# Patient Record
Sex: FEMALE | Race: BLACK OR AFRICAN AMERICAN | Employment: UNEMPLOYED | URBAN - METROPOLITAN AREA
[De-identification: names, ages, dates, MRNs, and addresses within clinical notes are randomized per-mention and may not be internally consistent; named-entity substitution may affect disease eponyms.]

---

## 2018-10-12 ENCOUNTER — HOSPITAL ENCOUNTER (EMERGENCY)
Facility: CLINIC | Age: 33
Discharge: HOME | End: 2018-10-13
Attending: EMERGENCY MEDICINE | Admitting: EMERGENCY MEDICINE
Payer: COMMERCIAL

## 2018-10-12 VITALS
DIASTOLIC BLOOD PRESSURE: 61 MMHG | OXYGEN SATURATION: 100 % | TEMPERATURE: 98.1 F | HEART RATE: 92 BPM | SYSTOLIC BLOOD PRESSURE: 110 MMHG | RESPIRATION RATE: 16 BRPM

## 2018-10-12 DIAGNOSIS — R10.2 VAGINAL PAIN: ICD-10-CM

## 2018-10-12 DIAGNOSIS — N94.3 PMS (PREMENSTRUAL SYNDROME): ICD-10-CM

## 2018-10-12 LAB
ALBUMIN UR-MCNC: NEGATIVE MG/DL
ANION GAP SERPL CALCULATED.3IONS-SCNC: 6 MMOL/L (ref 3–14)
APPEARANCE UR: CLEAR
BACTERIA #/AREA URNS HPF: ABNORMAL /HPF
BASOPHILS # BLD AUTO: 0 10E9/L (ref 0–0.2)
BASOPHILS NFR BLD AUTO: 0.2 %
BILIRUB UR QL STRIP: NEGATIVE
BUN SERPL-MCNC: 13 MG/DL (ref 7–30)
CALCIUM SERPL-MCNC: 8.3 MG/DL (ref 8.5–10.1)
CHLORIDE SERPL-SCNC: 107 MMOL/L (ref 94–109)
CO2 SERPL-SCNC: 28 MMOL/L (ref 20–32)
COLOR UR AUTO: ABNORMAL
CREAT SERPL-MCNC: 0.62 MG/DL (ref 0.52–1.04)
DIFFERENTIAL METHOD BLD: ABNORMAL
EOSINOPHIL # BLD AUTO: 0.2 10E9/L (ref 0–0.7)
EOSINOPHIL NFR BLD AUTO: 2.8 %
ERYTHROCYTE [DISTWIDTH] IN BLOOD BY AUTOMATED COUNT: 13.3 % (ref 10–15)
GFR SERPL CREATININE-BSD FRML MDRD: >90 ML/MIN/1.7M2
GLUCOSE SERPL-MCNC: 89 MG/DL (ref 70–99)
GLUCOSE UR STRIP-MCNC: NEGATIVE MG/DL
HCG UR QL: NEGATIVE
HCT VFR BLD AUTO: 34.4 % (ref 35–47)
HGB BLD-MCNC: 11.3 G/DL (ref 11.7–15.7)
HGB UR QL STRIP: NEGATIVE
IMM GRANULOCYTES # BLD: 0 10E9/L (ref 0–0.4)
IMM GRANULOCYTES NFR BLD: 0.2 %
INTERNAL QC OK POCT: YES
KETONES UR STRIP-MCNC: NEGATIVE MG/DL
LEUKOCYTE ESTERASE UR QL STRIP: NEGATIVE
LYMPHOCYTES # BLD AUTO: 2 10E9/L (ref 0.8–5.3)
LYMPHOCYTES NFR BLD AUTO: 35.8 %
MCH RBC QN AUTO: 29.1 PG (ref 26.5–33)
MCHC RBC AUTO-ENTMCNC: 32.8 G/DL (ref 31.5–36.5)
MCV RBC AUTO: 89 FL (ref 78–100)
MONOCYTES # BLD AUTO: 0.3 10E9/L (ref 0–1.3)
MONOCYTES NFR BLD AUTO: 5.9 %
NEUTROPHILS # BLD AUTO: 3.1 10E9/L (ref 1.6–8.3)
NEUTROPHILS NFR BLD AUTO: 55.1 %
NITRATE UR QL: NEGATIVE
NRBC # BLD AUTO: 0 10*3/UL
NRBC BLD AUTO-RTO: 0 /100
PH UR STRIP: 7 PH (ref 5–7)
PLATELET # BLD AUTO: 191 10E9/L (ref 150–450)
POTASSIUM SERPL-SCNC: 4.1 MMOL/L (ref 3.4–5.3)
RBC # BLD AUTO: 3.88 10E12/L (ref 3.8–5.2)
RBC #/AREA URNS AUTO: 0 /HPF (ref 0–2)
SODIUM SERPL-SCNC: 141 MMOL/L (ref 133–144)
SOURCE: ABNORMAL
SP GR UR STRIP: 1.01 (ref 1–1.03)
SQUAMOUS #/AREA URNS AUTO: 3 /HPF (ref 0–1)
UROBILINOGEN UR STRIP-MCNC: NORMAL MG/DL (ref 0–2)
WBC # BLD AUTO: 5.6 10E9/L (ref 4–11)
WBC #/AREA URNS AUTO: <1 /HPF (ref 0–5)

## 2018-10-12 PROCEDURE — 99284 EMERGENCY DEPT VISIT MOD MDM: CPT

## 2018-10-12 PROCEDURE — 99284 EMERGENCY DEPT VISIT MOD MDM: CPT | Mod: Z6 | Performed by: EMERGENCY MEDICINE

## 2018-10-12 PROCEDURE — 81001 URINALYSIS AUTO W/SCOPE: CPT | Performed by: STUDENT IN AN ORGANIZED HEALTH CARE EDUCATION/TRAINING PROGRAM

## 2018-10-12 PROCEDURE — 87210 SMEAR WET MOUNT SALINE/INK: CPT | Performed by: EMERGENCY MEDICINE

## 2018-10-12 PROCEDURE — 81025 URINE PREGNANCY TEST: CPT | Performed by: EMERGENCY MEDICINE

## 2018-10-12 PROCEDURE — 87491 CHLMYD TRACH DNA AMP PROBE: CPT | Performed by: EMERGENCY MEDICINE

## 2018-10-12 PROCEDURE — 87591 N.GONORRHOEAE DNA AMP PROB: CPT | Performed by: EMERGENCY MEDICINE

## 2018-10-12 PROCEDURE — 85025 COMPLETE CBC W/AUTO DIFF WBC: CPT | Performed by: EMERGENCY MEDICINE

## 2018-10-12 PROCEDURE — 80048 BASIC METABOLIC PNL TOTAL CA: CPT | Performed by: EMERGENCY MEDICINE

## 2018-10-12 RX ORDER — IBUPROFEN 200 MG
400 TABLET ORAL EVERY 8 HOURS PRN
Qty: 30 TABLET | Refills: 0 | Status: SHIPPED | OUTPATIENT
Start: 2018-10-12

## 2018-10-12 ASSESSMENT — ENCOUNTER SYMPTOMS
BACK PAIN: 1
PALPITATIONS: 0
NAUSEA: 0
ABDOMINAL PAIN: 1
FATIGUE: 1
DIZZINESS: 0
DYSURIA: 0
CHILLS: 0
DIFFICULTY URINATING: 0
DIARRHEA: 0
SHORTNESS OF BREATH: 0
COUGH: 0
FEVER: 0
VOMITING: 0
CONSTIPATION: 0

## 2018-10-12 NOTE — ED AVS SNAPSHOT
[facility], Emergency Department    [address]    [place] [address]    Phone:  [phone]    Fax:  [phone]                                       Ella Jacobs   MRN: [id number]    Department:  [facility], Emergency Department   Date of Visit:  10/12/2018           Patient Information     Date Of Birth          1985        Your diagnoses for this visit were:     Vaginal pain     PMS (premenstrual syndrome)        You were seen by Francisco J Painting MD.      Follow-up Information     Follow up with Syeda Bojorquez In 1 week.    Specialty:  Family Practice    Contact information:    [facility]  [facility] [address] [phone]          Discharge Instructions       Follow up with PCP within 1 week.  Take ibuprofen as needed for pain.   Return to ED if she experiences nausea, vomiting, fever, chills, or acute worsening of unilateral pain.     Discharge References/Attachments     PELVIC PAIN, UNKNOWN CAUSE (ENGLISH)      24 Hour Appointment Hotline       To make an appointment at any [facility] clinic, call [phone] ([phone]). If you don't have a family doctor or clinic, we will help you find one. [place] clinics are conveniently located to serve the needs of you and your family.             Review of your medicines      START taking        Dose / Directions Last dose taken    ibuprofen 200 MG tablet   Commonly known as:  ADVIL/MOTRIN   Dose:  400 mg   Quantity:  30 tablet        Take 2 tablets (400 mg) by mouth every 8 hours as needed for pain   Refills:  0          Our records show that you are taking the medicines listed below. If these are incorrect, please call your family doctor or clinic.        Dose / Directions Last dose taken    NO ACTIVE MEDICATIONS        Refills:  0        triamcinolone 0.5 % cream   Commonly known as:  KENALOG   Quantity:  30 g        Apply to affected area twice a day for 7 days.   Refills:  0         VITAMIN D (CHOLECALCIFEROL) PO   Dose:  2000 Units        Take 2,000 Units by mouth daily   Refills:  0                Prescriptions were sent or printed at these locations (1 Prescription)                   Other Prescriptions                Printed at Department/Unit printer (1 of 1)         ibuprofen (ADVIL/MOTRIN) 200 MG tablet                Procedures and tests performed during your visit     Basic metabolic panel    CBC with platelets differential    Chlamydia trachomatis PCR    Neisseria gonorrhoea PCR    Prep for procedure - pelvic exam    UA with Microscopic    Wet prep    hCG qual urine POCT      Orders Needing Specimen Collection     None      Pending Results     Date and Time Order Name Status Description    10/12/2018 2229 Neisseria gonorrhoea PCR In process     10/12/2018 2229 Chlamydia trachomatis PCR In process             Pending Culture Results     Date and Time Order Name Status Description    10/12/2018 2229 Neisseria gonorrhoea PCR In process     10/12/2018 2229 Chlamydia trachomatis PCR In process             Pending Results Instructions     If you had any lab results that were not finalized at the time of your Discharge, you can call the ED Lab Result RN at [phone]. You will be contacted by this team for any positive Lab results or changes in treatment. The nurses are available 7 days a week from 10A to 6:30P.  You can leave a message 24 hours per day and they will return your call.        Thank you for choosing [facility]       Thank you for choosing [facility] for your care. Our goal is always to provide you with excellent care. Hearing back from our patients is one way we can continue to improve our services. Please take a few minutes to complete the written survey that you may receive in the mail after you visit with us. Thank you!        [facility] Information     [employer] lets you send messages to your doctor, view your test results, renew your prescriptions, schedule appointments and  "more. To sign up, go to [email or web address] . Click on \"Log in\" on the left side of the screen, which will take you to the Welcome page. Then click on \"Sign up Now\" on the right side of the page.     You will be asked to enter the access code listed below, as well as some personal information. Please follow the directions to create your username and password.     Your access code is: [email or web address]  Expires: 2019 12:23 AM     Your access code will  in 90 days. If you need help or a new code, please call your [facility] clinic or [phone].        Care EveryWhere ID     This is your Care EveryWhere ID. This could be used by other organizations to access your [facility] medical records  [id number]        Equal Access to Services     GERARDO ALCOCER : Vu Lawton, deyvi bojorquez, rachel rosen, bharati mackey. So [facility] [phone].    ATENCIÓN: Si habla español, tiene a smith disposición servicios gratuitos de asistencia lingüística. Llame al [phone].    We comply with applicable federal civil rights laws and [place] laws. We do not discriminate on the basis of race, color, national origin, age, disability, sex, sexual orientation, or gender identity.            After Visit Summary       This is your record. Keep this with you and show to your community pharmacist(s) and doctor(s) at your next visit.                  "

## 2018-10-12 NOTE — ED AVS SNAPSHOT
[facility], [place], Emergency Department    [address]    [place] [address]    Phone:  [phone]    Fax:  [phone]                                       Ella Jacobs   MRN: [id number]    Department:  [facility], Emergency Department   Date of Visit:  10/12/2018           After Visit Summary Signature Page     I have received my discharge instructions, and my questions have been answered. I have discussed any challenges I see with this plan with the nurse or doctor.    ..........................................................................................................................................  Patient/Patient Representative Signature      ..........................................................................................................................................  Patient Representative Print Name and Relationship to Patient    ..................................................               ................................................  Date                                   Time    ..........................................................................................................................................  Reviewed by Signature/Title    ...................................................              ..............................................  Date                                               Time          22EPIC Rev 08/18

## 2018-10-13 LAB
SPECIMEN SOURCE: NORMAL
WET PREP SPEC: NORMAL

## 2018-10-13 PROCEDURE — 25000132 ZZH RX MED GY IP 250 OP 250 PS 637: Performed by: STUDENT IN AN ORGANIZED HEALTH CARE EDUCATION/TRAINING PROGRAM

## 2018-10-13 RX ORDER — IBUPROFEN 600 MG/1
600 TABLET, FILM COATED ORAL ONCE
Status: COMPLETED | OUTPATIENT
Start: 2018-10-13 | End: 2018-10-13

## 2018-10-13 RX ADMIN — IBUPROFEN 600 MG: 600 TABLET ORAL at 00:29

## 2018-10-13 NOTE — ED PROVIDER NOTES
"  History     Chief Complaint   Patient presents with     Abdominal Pain     Onset last week with lower abdominal pain, tender breasts, vaginal pain, negative home pregnancy test.     The history is provided by the patient. No  was used.     Ella Jacobs is a 33 year old otherwise healthy female presenting to the ED with abdominal pain and vaginal pain. She has had abdominal pain around RLQ and LLQ for 1 week described as crampy that comes and goes, \"feels like I am going to get my period or that I am pregnant\". She tested her urine 3 times at home for pregnancy, all negative. She reports breast tenderness and back pain as well \"that could be either me starting my period or me being pregnant\". Denies dysuria. Denies vaginal discharge or odor. Sexually active with , no protection. New vaginal pain for the past day which is what brought her to the ED as she visited an Urgent Care earlier today (with testing another UPT which was negative) where they recommended she come to ED for further workup. She says the vaginal pain makes it hard to walk. Not unilateral pain. Feels \"pressure, like something is wanting to come out\".     I have reviewed the Medications, Allergies, Past Medical and Surgical History, and Social History in the Epic system.    Review of Systems   Constitutional: Positive for fatigue. Negative for chills and fever.   Respiratory: Negative for cough and shortness of breath.    Cardiovascular: Negative for chest pain, palpitations and leg swelling.   Gastrointestinal: Positive for abdominal pain (crampy pain in lower quadrants). Negative for constipation, diarrhea, nausea and vomiting.   Genitourinary: Positive for vaginal pain. Negative for difficulty urinating, dysuria, vaginal bleeding and vaginal discharge. Menstrual problem: no period since 09/02.   Musculoskeletal: Positive for back pain (mild).   Neurological: Negative for dizziness.       Physical Exam   BP: " 112/59  Pulse: 86  Heart Rate: 86  Temp: 97.2  F (36.2  C)  Resp: 18  SpO2: 98 %    Physical Exam   Constitutional: She is oriented to person, place, and time. She appears well-developed and well-nourished. No distress.   HENT:   Head: Normocephalic and atraumatic.   Cardiovascular: Normal rate, regular rhythm, normal heart sounds and intact distal pulses.    No murmur heard.  Pulmonary/Chest: Breath sounds normal. No respiratory distress. She has no wheezes.   Abdominal: Soft. Bowel sounds are normal. She exhibits no distension. There is no tenderness. There is no rebound.   Genitourinary: Uterus normal.       There is breast tenderness. There is no tenderness or lesion on the right labia. There is no tenderness or lesion on the left labia. Cervix exhibits no motion tenderness, no discharge and no friability. Right adnexum displays no tenderness. Left adnexum displays no tenderness. There is tenderness in the vagina. No erythema or bleeding in the vagina. No vaginal discharge found.   Genitourinary Comments: Patient reports pain where depicted; no edema, erythema, or any visual abnormality   Musculoskeletal: She exhibits no edema.   Neurological: She is alert and oriented to person, place, and time.   Skin: Skin is warm and dry. She is not diaphoretic.   Psychiatric: She has a normal mood and affect. Her behavior is normal.   Vitals reviewed.    ED Course     ED Course     Procedures           Labs Ordered and Resulted from Time of ED Arrival Up to the Time of Departure from the ED   ROUTINE UA WITH MICROSCOPIC - Abnormal; Notable for the following:        Result Value    Bacteria Urine Few (*)     Squamous Epithelial /HPF Urine 3 (*)     All other components within normal limits   CBC WITH PLATELETS DIFFERENTIAL - Abnormal; Notable for the following:     Hemoglobin 11.3 (*)     Hematocrit 34.4 (*)     All other components within normal limits   BASIC METABOLIC PANEL - Abnormal; Notable for the following:      Calcium 8.3 (*)     All other components within normal limits   PREP FOR PROCEDURE   WET PREP   CHLAMYDIA TRACHOMATIS PCR   NEISSERIA GONORRHOEAE PCR        Assessments & Plan (with Medical Decision Making)   Ella Jacobs is a 33 year old otherwise healthy female presenting to the ED with abdominal pain and vaginal pain. Initial differential diagnosis includes but is not limited to IUP, ectopic pregnancy, premenstration symptoms, PMDD, appendicitis, viral GE, ovarian torsion, PID, gonorrhea, chlamydia, Fibroid, constipation, ovarian cyst.     Vitals are reassuring. Patient is sitting comfortably in bed in no distress. Abdomen exam is benign. Pelvic exam shows grossly normal vaginal canal and normal cervix. No CMT. When I ask patient to point to her origin of vaginal pain on pelvic exam, she points to both right and left labia majora, which appear normal. Wet prep shows     BMP shows calcium of 8.3, otherwise within normal limits. CBC shows no leukocytosis. Hgb slightly low at 11.3.   UA shows no acute infectious process and no hematuria. Gonorrhea pending. Chlamydia pending. Wet prep shows no clue cells, no trichomonas, no yeast. No clear etiology of patient's vaginal and lower quadrant pain. It is likely due to her beginning her menstruation and potentially a uterine fibroid. Patient notes she has a history of fibroids but did not know these could cause pain. Take ibuprofen as needed for pain. Told patient to return to ED if she experiences nausea, vomiting, fever, chills, or acute worsening of unilateral pain.     I have reviewed the nursing notes.    I have reviewed the findings, diagnosis, plan and need for follow up with the patient.  New Prescriptions    IBUPROFEN (ADVIL/MOTRIN) 200 MG TABLET    Take 2 tablets (400 mg) by mouth every 8 hours as needed for pain     Final diagnoses:   Vaginal pain   PMS (premenstrual syndrome)     10/12/2018   [facility], [place], EMERGENCY DEPARTMENT    David Lee MD  PGY-1  [facility]      This data collected with the Resident working in the Emergency Department.  Patient was seen and evaluated by myself and I repeated the history and physical exam with the patient.  The plan of care was discussed with them.  The key portions of the note including the entire assessment and plan reflect my documentation.    MD Mert Radford Ford Christian, MD  10/13/18 0056

## 2018-10-13 NOTE — DISCHARGE INSTRUCTIONS
Follow up with PCP within 1 week.  Take ibuprofen as needed for pain.   Return to ED if she experiences nausea, vomiting, fever, chills, or acute worsening of unilateral pain.

## 2018-10-14 LAB
C TRACH DNA SPEC QL NAA+PROBE: NEGATIVE
N GONORRHOEA DNA SPEC QL NAA+PROBE: NEGATIVE
SPECIMEN SOURCE: NORMAL
SPECIMEN SOURCE: NORMAL

## 2021-07-25 PROCEDURE — 99283 EMERGENCY DEPT VISIT LOW MDM: CPT

## 2021-07-25 PROCEDURE — 99284 EMERGENCY DEPT VISIT MOD MDM: CPT | Performed by: EMERGENCY MEDICINE

## 2021-07-26 ENCOUNTER — HOSPITAL ENCOUNTER (EMERGENCY)
Facility: CLINIC | Age: 36
Discharge: HOME | End: 2021-07-26
Attending: EMERGENCY MEDICINE | Admitting: EMERGENCY MEDICINE
Payer: COMMERCIAL

## 2021-07-26 VITALS
DIASTOLIC BLOOD PRESSURE: 82 MMHG | RESPIRATION RATE: 15 BRPM | HEART RATE: 74 BPM | OXYGEN SATURATION: 96 % | SYSTOLIC BLOOD PRESSURE: 123 MMHG | TEMPERATURE: 99.1 F

## 2021-07-26 DIAGNOSIS — G89.29 CHRONIC LOW BACK PAIN, UNSPECIFIED BACK PAIN LATERALITY, UNSPECIFIED WHETHER SCIATICA PRESENT: ICD-10-CM

## 2021-07-26 DIAGNOSIS — M54.50 CHRONIC LOW BACK PAIN, UNSPECIFIED BACK PAIN LATERALITY, UNSPECIFIED WHETHER SCIATICA PRESENT: ICD-10-CM

## 2021-07-26 RX ORDER — CYCLOBENZAPRINE HCL 10 MG
10 TABLET ORAL 3 TIMES DAILY PRN
Qty: 20 TABLET | Refills: 0 | Status: SHIPPED | OUTPATIENT
Start: 2021-07-26 | End: 2021-08-01

## 2021-07-26 ASSESSMENT — ENCOUNTER SYMPTOMS: HIP PAIN: 1

## 2021-07-26 NOTE — DISCHARGE INSTRUCTIONS
Please make an appointment to follow up with Your Primary Care Provider and Sports Medicine (phone: [phone]) as soon as possible.    You may need to pursue an MRI of your low back or more physical therapy.    Return to the emergency department if you develop weakness, numbness in your legs or loss of control of your bowel or bladder.    If Ella has discomfort from fever or other pain, she can have:  Acetaminophen (Tylenol) every 6 hours as needed. Her dose is:    2 regular strength tabs (650 mg)                                     (43.2+ kg/96+ lb)    NOTE: If your acetaminophen (Tylenol) came with a dropper marked with 0.4 and 0.8 ml, call us ([phone]) or check with your doctor about the dose before using it.     AND/OR      Ibuprofen (Advil, Motrin) every 6 hours as needed. His/her dose is:    2 regular strength tabs (400 mg)                                                                         (40-60 kg/ lb)

## 2021-07-26 NOTE — ED PROVIDER NOTES
[facility] EMERGENCY DEPARTMENT ([facility])    21        History     Chief Complaint   Patient presents with     Hip Pain     Muscle pain since 2016, worse since Friday, bilateral hip pain radiating to legs, no relief with tylenol.     The history is provided by the patient and medical records.     Ella Jacobs is a 36 year old otherwise healthy female who presents to the Emergency Department with low back pain. Patient reports muscle pain beginning about 5 years ago. She denies injury or car accident at that time. Patient reports low back pain that shoots down her legs. She states pain often moves around. Patient reports one minute it will be in her hips then pain will subside with heat or massage and return in a couple of hours and be in her thighs or bottom. She notes she takes Tylenol and ibuprofen when she feels like she cannot handle the pain and this provides some relief. Patient reports sometimes pain will subside for a few weeks and return intermittently. She notes pain has increased since having a  no . She denies recent fall or injury. Patient denies fevers, loss of control of bowel or bladder, numbness, weakness, or tingling. Patient reports she has tried physical therapy for this x3 months without relief. She denies chance of pregnancy.  She has no pain right now.    Past Medical History  History reviewed. No pertinent past medical history.  Past Surgical History:   Procedure Laterality Date     GYN SURGERY       Acetaminophen 325 MG CAPS  VITAMIN D, CHOLECALCIFEROL, PO  ibuprofen (ADVIL/MOTRIN) 200 MG tablet  NO ACTIVE MEDICATIONS  triamcinolone (KENALOG) 0.5 % cream      No Known Allergies  Family History  No family history on file.  Social History   Social History     Tobacco Use     Smoking status: Never Smoker     Smokeless tobacco: Never Used   Substance Use Topics     Alcohol use: No     Drug use: No      Past medical history, past surgical history, medications,  allergies, family history, and social history were reviewed with the patient. No additional pertinent items.       Review of Systems   ROS: 14 point ROS neg other than the symptoms noted above in the HPI.  A complete review of systems was performed with pertinent positives and negatives noted in the HPI, and all other systems negative.    Physical Exam   BP: 125/45  Pulse: 79  Temp: 99.1  F (37.3  C)  SpO2: 95 %  Physical Exam  Physical Exam   Constitutional: oriented to person, place, and time. appears well-developed and well-nourished.   HENT:   Head: Normocephalic and atraumatic.   Neck: Normal range of motion.   Pulmonary/Chest: Effort normal. No respiratory distress.   Cardiac: No murmurs, rubs, gallops. RRR.  Abdominal: Abdomen soft, nontender, nondistended. No rebound tenderness.  MSK: Long bones without deformity or evidence of trauma.  Nontender palpation over the lumbar spine or paraspinous muscles of the back.  Nontender palpation of the pelvis with compression.  Nontender palpation of the femurs, knees, lower legs or feet with palpation.  Range of motion of hips is normal actively and passively without pain or restriction.  Range of motion bilateral knees normal without pain or restriction.  There is no swelling appreciable over the lower extremities over joints.  Neurological: alert and oriented to person, place, and time. Gait is stable. Patellar DTR 2+ bilaterally.  Sensation is intact to lower extremities.  No clonus.  Skin: Skin is warm and dry. No erythema or rashes.  Psychiatric:  normal mood and affect.  behavior is normal. Thought content normal.     ED Course   12:53 AM  The patient was seen and examined by Pascual Fall MD in Room ED07.      Procedures            No results found for any visits on 07/26/21.  Medications - No data to display     Assessments & Plan (with Medical Decision Making)   MDM  Patient presenting with what sounds to be intermittent sciatica.  Currently she is  asymptomatic.  Straight leg is negative bilaterally.  She has no red flags to suggest cauda equina, epidural abscess or other acute spinous process.  Do not feel MRI or imaging is necessary at this point.  No recent trauma.  She appears well and nontoxic.  Due to lack of symptoms right now on examination do not feel further work-up is necessary at this point.  We will give a referral to sports medicine, discussed possibly pursuing MRI for her lumbar spine as it does sound like she is having sciatica.  Flexeril given in case she does have symptoms.  Recommended continue Tylenol, ibuprofen with symptoms.  Also discussed importance of physical therapy.    I have reviewed the nursing notes. I have reviewed the findings, diagnosis, plan and need for follow up with the patient.    New Prescriptions    No medications on file       Final diagnoses:   Chronic low back pain, unspecified back pain laterality, unspecified whether sciatica present     I, Kathia Flanagan am serving as a trained medical scribe to document services personally performed by Pascual Fall MD, based on the provider's statements to me.      I,  Pascual Fall MD, was physically present and have reviewed and verified the accuracy of this note documented by Kathia Flanagan.     --  Pascual Fall MD  [facility] EMERGENCY DEPARTMENT  7/25/2021     Pascual Fall MD  07/26/21 0105

## 2021-07-26 NOTE — ED NOTES
Patient upset about wait time and seeing other patients go back before her. Pt educated on triage process and RN being unable to give exact timing when patient will get back to room